# Patient Record
(demographics unavailable — no encounter records)

---

## 2024-12-12 NOTE — PLAN
[FreeTextEntry1] :  Discussed risk of being on controlled substances  Education on Side effect profile , medication admin instructions reviewed, risk vs benefit discussed  will continue to follow up with previous provider   Denying any concerns today  Patient instructed to notify office with any new or worsening S&S as educated- patient agreeable with plan.

## 2024-12-12 NOTE — HISTORY OF PRESENT ILLNESS
[FreeTextEntry1] : follow up  [de-identified] : Dec 11 2024 10:00AM   39 year old male returns for follow up appointment- Interim hx: denying any gout flares  PMH: Chronic Back pain , lumbar radiculopathy, HLD, hypothy, Obesity, Gout , asthma  Status post total thyroidectomy Social Hx: nonsmoker- no alcohol use    Allergies: PCN - unknown  Meds: allopurinol, celebrex,  sees endocrinology: xyosted, mounjaro, thy,  percocet,  lyrica- did not help is not taking  inhalers- albuterol prn  Supplements: folic acid , vit D weekly     [facility]

## 2024-12-12 NOTE — HISTORY OF PRESENT ILLNESS
[FreeTextEntry1] : follow up  [de-identified] : Dec 11 2024 10:00AM   39 year old male returns for follow up appointment- Interim hx: denying any gout flares  PMH: Chronic Back pain , lumbar radiculopathy, HLD, hypothy, Obesity, Gout , asthma  Status post total thyroidectomy Social Hx: nonsmoker- no alcohol use    Allergies: PCN - unknown  Meds: allopurinol, celebrex,  sees endocrinology: xyosted, mounjaro, thy,  percocet,  lyrica- did not help is not taking  inhalers- albuterol prn  Supplements: folic acid , vit D weekly     [facility]
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
## 2024-12-17 NOTE — REASON FOR VISIT
[DM Type 2] : DM Type 2 [Weight Management/Obesity] : weight management/obesity [Hypogonadism] : hypogonadism

## 2024-12-17 NOTE — ASSESSMENT
[FreeTextEntry1] : 1. Borderline diabetes mellitus. Morbid obesity - Weight loss is crucial - Mounjaro 15 mg qw (R+B)  2. Hypogonadism Discussed with the patient in details current approaches to hypogonadism and metabolic syndrome management. It is likely multifactorial in etiology , including underlying obesity, chronic use of opioid pain killers, prediabetes and possible underlying TEE. I've advised an evaluation for sleep apnea, and suggested an extensive nutritional education program, including weight loss teaching and evaluation. Proper dietary restrictions and exercise routines discussed. - I advised that the successful weight loss will likely to improve testosterone levels, as well as sleep apnea, blood pressure and sugar, and a cholesterol. - Xyosted 100 mg qw. - cont with phlebotomies  3. Multinodular goiter/ FN S/p total thyroidectomy with a benign path .  - cont Synthroid 200 mcg for now Proper intake of levothyroxine is reviewed in details, including on an empty stomach, with water only, at least one hour before taking any medications, vitamins, or supplements and three-four hours before taking iron or calcium. - Rt neck level 4 Lymph node with cytology suggestive of a granulomatous disease- negative core biopsy. Will repeat sono in 03/25 and if persistent HANNAH consider surgical excision RTC 3 months, after the next sono

## 2025-03-07 NOTE — HISTORY OF PRESENT ILLNESS
[FreeTextEntry1] : follow up  [de-identified] : 3/7/25: Pt is presenting today for med renewal. Hx of asthma, chronic back pain. Complaining of URI symptoms that started last week. Originally had sore throat, body aches, congestion, rhinorrhea. Denies chest pain, SOB, chest pain. Body aches and sore throat relieved. Still has congestion, which pt is now expelling. Yellowish-green mucus. Pt states he is overall beginning to feel better. Has been taking OTC tylenol cold and flu severe.    Dec 11 2024 10:00AM   39 year old male returns for follow up appointment- Interim hx: denying any gout flares  PMH: Chronic Back pain , lumbar radiculopathy, HLD, hypothy, Obesity, Gout , asthma  Status post total thyroidectomy Social Hx: nonsmoker- no alcohol use    Allergies: PCN - unknown  Meds: allopurinol, celebrex,  sees endocrinology: xyosted, mounjaro, thy,  percocet,  lyrica- did not help is not taking  inhalers- albuterol prn  Supplements: folic acid , vit D weekly     [employer]

## 2025-03-07 NOTE — PHYSICAL EXAM
[Coordination Grossly Intact] : coordination grossly intact [Normal] : affect was normal and insight and judgment were intact [de-identified] : post nasal drip

## 2025-04-03 NOTE — ASSESSMENT
[FreeTextEntry1] : 1. Borderline diabetes mellitus. Morbid obesity - Weight loss is crucial - Mounjaro 15 mg qw (R+B)  2. Hypogonadism Discussed with the patient in details current approaches to hypogonadism and metabolic syndrome management. It is likely multifactorial in etiology , including underlying obesity, chronic use of opioid pain killers, prediabetes and possible underlying TEE. I've advised an evaluation for sleep apnea, and suggested an extensive nutritional education program, including weight loss teaching and evaluation. Proper dietary restrictions and exercise routines discussed. - I advised that the successful weight loss will likely to improve testosterone levels, as well as sleep apnea, blood pressure and sugar, and a cholesterol. - Xyosted 100 mg qw. - cont with phlebotomies  3. Multinodular goiter/ FN S/p total thyroidectomy with a benign path .  - cont Synthroid 225 mcg for now Proper intake of levothyroxine is reviewed in details, including on an empty stomach, with water only, at least one hour before taking any medications, vitamins, or supplements and three-four hours before taking iron or calcium. - Rt neck level 4 Lymph node with cytology suggestive of a granulomatous disease- negative core biopsy. Will repeat sono this month and if persistent HANNAH consider surgical excision - retest levels today RTC 3 months
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
## 2025-06-06 NOTE — PHYSICAL EXAM
[Coordination Grossly Intact] : coordination grossly intact [Normal] : affect was normal and insight and judgment were intact [de-identified] : post nasal drip

## 2025-06-06 NOTE — HISTORY OF PRESENT ILLNESS
[FreeTextEntry1] : follow up  [de-identified] : 6/6/25 200mg 1m .4 every 3.5 days 719 cypionate 3/7/25: Pt is presenting today for med renewal. Hx of asthma, chronic back pain. Complaining of URI symptoms that started last week. Originally had sore throat, body aches, congestion, rhinorrhea. Denies chest pain, SOB, chest pain. Body aches and sore throat relieved. Still has congestion, which pt is now expelling. Yellowish-green mucus. Pt states he is overall beginning to feel better. Has been taking OTC tylenol cold and flu severe.    Dec 11 2024 10:00AM   39 year old male returns for follow up appointment- Interim hx: denying any gout flares  PMH: Chronic Back pain , lumbar radiculopathy, HLD, hypothy, Obesity, Gout , asthma  Status post total thyroidectomy Social Hx: nonsmoker- no alcohol use    Allergies: PCN - unknown  Meds: allopurinol, celebrex,  sees endocrinology: xyosted, mounjaro, thy,  percocet,  lyrica- did not help is not taking  inhalers- albuterol prn  Supplements: folic acid , vit D weekly     [employer]
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
## 2025-07-03 NOTE — ASSESSMENT
[FreeTextEntry1] : 1. Borderline diabetes mellitus. Morbid obesity - Weight loss is crucial - Mounjaro 15 mg qw (R+B)  2. Hypogonadism Discussed with the patient in details current approaches to hypogonadism and metabolic syndrome management. It is likely multifactorial in etiology , including underlying obesity, chronic use of opioid pain killers, prediabetes and possible underlying TEE. I've advised an evaluation for sleep apnea, and suggested an extensive nutritional education program, including weight loss teaching and evaluation. Proper dietary restrictions and exercise routines discussed. - I advised that the successful weight loss will likely to improve testosterone levels, as well as sleep apnea, blood pressure and sugar, and a cholesterol. -  keep off Xyosted since patient wants to continue receiving [facility]. - Caution with over replacement.  Monitor levels. cont with phlebotomies  3. Multinodular goiter/ FN S/p total thyroidectomy with a benign path .  - cont Synthroid 275 mcg for now Proper intake of levothyroxine is reviewed in details, including on an empty stomach, with water only, at least one hour before taking any medications, vitamins, or supplements and three-four hours before taking iron or calcium. - Rt neck level 4 Lymph node with cytology suggestive of a granulomatous disease- negative core biopsy. Will repeat sono this month and if persistent HANNAH consider surgical excision RTC 4 months

## 2025-07-15 NOTE — PHYSICAL EXAM
[Well-Appearing] : well-appearing [No Respiratory Distress] : no respiratory distress  [No Joint Swelling] : no joint swelling [de-identified] : in distress 2/2 shoulder pain  [de-identified] : limited ROM right shoulder

## 2025-07-15 NOTE — HISTORY OF PRESENT ILLNESS
[FreeTextEntry8] : c/o right shoulder pain, reports hurt while lifting weights on Thursday.  Seeing chiropractor since Thursday, had trigger point injection on Thursday, taking meloxicam 15 mg with no relief.  states pain as 10/10.

## 2025-07-15 NOTE — ASSESSMENT
[FreeTextEntry1] : counseled  x ray shoulder  Depo Medrol 40 mg IM X 1 d/c meloxicam  start diclofenac  if no improvement ortho referral.

## 2025-07-15 NOTE — PHYSICAL EXAM
[Well-Appearing] : well-appearing [No Respiratory Distress] : no respiratory distress  [No Joint Swelling] : no joint swelling [de-identified] : in distress 2/2 shoulder pain  [de-identified] : limited ROM right shoulder

## 2025-07-22 NOTE — HISTORY OF PRESENT ILLNESS
[FreeTextEntry1] : Here for follow up [de-identified] : Pt c/o having right shoulder pain. Denies falls. Stated he worked out in the gym and lifted weights. Pt stated he saw chiropractor for left shoulder pain with mild improvement. As per pt few days later the pain came back. Pt stated he has full range of motion.

## 2025-07-22 NOTE — PHYSICAL EXAM
[No Acute Distress] : no acute distress [Well Nourished] : well nourished [Well Developed] : well developed [Well-Appearing] : well-appearing [No Joint Swelling] : no joint swelling [Grossly Normal Strength/Tone] : grossly normal strength/tone [Normal Affect] : the affect was normal [Normal Insight/Judgement] : insight and judgment were intact [de-identified] : right shoulder pain

## 2025-07-22 NOTE — REVIEW OF SYSTEMS
[Joint Pain] : joint pain [Joint Stiffness] : joint stiffness [Muscle Pain] : muscle pain [Negative] : Heme/Lymph [Muscle Weakness] : no muscle weakness [Back Pain] : no back pain [Joint Swelling] : no joint swelling [FreeTextEntry9] : right shoulder pain, had been relieved with intra articular joint injection

## 2025-07-22 NOTE — PHYSICAL EXAM
[No Acute Distress] : no acute distress [Well Nourished] : well nourished [Well Developed] : well developed [Well-Appearing] : well-appearing [No Joint Swelling] : no joint swelling [Grossly Normal Strength/Tone] : grossly normal strength/tone [Normal Affect] : the affect was normal [Normal Insight/Judgement] : insight and judgment were intact [de-identified] : right shoulder pain

## 2025-07-22 NOTE — HISTORY OF PRESENT ILLNESS
[FreeTextEntry1] : Here for follow up [de-identified] : Pt c/o having right shoulder pain. Denies falls. Stated he worked out in the gym and lifted weights. Pt stated he saw chiropractor for left shoulder pain with mild improvement. As per pt few days later the pain came back. Pt stated he has full range of motion.

## 2025-07-22 NOTE — PHYSICAL EXAM
[No Acute Distress] : no acute distress [Well Nourished] : well nourished [Well Developed] : well developed [Well-Appearing] : well-appearing [No Joint Swelling] : no joint swelling [Grossly Normal Strength/Tone] : grossly normal strength/tone [Normal Affect] : the affect was normal [Normal Insight/Judgement] : insight and judgment were intact [de-identified] : right shoulder pain

## 2025-07-22 NOTE — HISTORY OF PRESENT ILLNESS
[FreeTextEntry1] : Here for follow up [de-identified] : Pt c/o having right shoulder pain. Denies falls. Stated he worked out in the gym and lifted weights. Pt stated he saw chiropractor for left shoulder pain with mild improvement. As per pt few days later the pain came back. Pt stated he has full range of motion.